# Patient Record
Sex: FEMALE | Race: WHITE | NOT HISPANIC OR LATINO | URBAN - METROPOLITAN AREA
[De-identification: names, ages, dates, MRNs, and addresses within clinical notes are randomized per-mention and may not be internally consistent; named-entity substitution may affect disease eponyms.]

---

## 2021-01-11 ENCOUNTER — OFFICE VISIT (OUTPATIENT)
Dept: ORTHOPEDIC SURGERY | Facility: CLINIC | Age: 56
End: 2021-01-11

## 2021-01-11 VITALS — HEIGHT: 63 IN | BODY MASS INDEX: 28.53 KG/M2 | TEMPERATURE: 96.9 F | WEIGHT: 161 LBS

## 2021-01-11 DIAGNOSIS — R52 PAIN: Primary | ICD-10-CM

## 2021-01-11 PROCEDURE — 99203 OFFICE O/P NEW LOW 30 MIN: CPT | Performed by: NURSE PRACTITIONER

## 2021-01-11 PROCEDURE — 20610 DRAIN/INJ JOINT/BURSA W/O US: CPT | Performed by: NURSE PRACTITIONER

## 2021-01-11 PROCEDURE — 73501 X-RAY EXAM HIP UNI 1 VIEW: CPT | Performed by: NURSE PRACTITIONER

## 2021-01-11 RX ORDER — METHYLPREDNISOLONE ACETATE 80 MG/ML
80 INJECTION, SUSPENSION INTRA-ARTICULAR; INTRALESIONAL; INTRAMUSCULAR; SOFT TISSUE
Status: COMPLETED | OUTPATIENT
Start: 2021-01-11 | End: 2021-01-11

## 2021-01-11 RX ORDER — NAPROXEN SODIUM 220 MG
220 TABLET ORAL 2 TIMES DAILY PRN
COMMUNITY

## 2021-01-11 RX ADMIN — METHYLPREDNISOLONE ACETATE 80 MG: 80 INJECTION, SUSPENSION INTRA-ARTICULAR; INTRALESIONAL; INTRAMUSCULAR; SOFT TISSUE at 10:40

## 2021-01-11 NOTE — PROGRESS NOTES
"Patient: Ethel Lentz  YOB: 1965 55 y.o. female  Medical Record Number: [id number]    Chief Complaints:   Chief Complaint   Patient presents with   • Left Hip - Establish Care, Pain       History of Present Illness:Ethel Lentz is a 55 y.o. female who presents as a new patient both myself as well as the practice with complaints of left lateral hip pain.  Patient denies any injury.  Reports it started several months ago has progressively gotten worse.  Describes the hip pain as a moderate throbbing aching type pain worse at night when she lies on her side also standing working walking running climbing stairs, better with heat and Aleve    Allergies:   Allergies   Allergen Reactions   • Latex Swelling     Per patient Latex gloves made her hands swell   • Sulfur Hives       Medications:   No current outpatient medications on file.     No current facility-administered medications for this visit.          The following portions of the patient's history were reviewed and updated as appropriate: allergies, current medications, past family history, past medical history, past social history, past surgical history and problem list.    Review of Systems:   A 14 point review of systems was performed. All systems negative except pertinent positives/negative listed in HPI above    Physical Exam:   Vitals:    01/11/21 1021   Temp: 96.9 °F (36.1 °C)   TempSrc: Temporal   Weight: 73 kg (161 lb)   Height: 160 cm (63\")       General: A and O x 3, ASA, NAD    SCLERA:    Normal    DENTITION:   Normal  Skin clear no unusual lesions noted  Left hip patient is tender over left hip greater trochanteric bursa she has normal internal X rotation with a negative Stinchfield negative logroll calf is soft and nontender    Radiology:  Xrays 2 views of left hip ordered and reviewed today secondary to pain and were normal.  No compared to views available    Assessment/Plan:  Left hip greater trochanteric bursitis    Patient I " discussed options, she would like to proceed with left hip bursa injection, she will start outpatient physical therapy and I will see her back as needed    Large Joint Arthrocentesis: L greater trochanteric bursa  Date/Time: 1/11/2021 10:40 AM  Consent given by: patient  Site marked: site marked  Timeout: Immediately prior to procedure a time out was called to verify the correct patient, procedure, equipment, support staff and site/side marked as required   Supporting Documentation  Indications: pain   Procedure Details  Location: hip - L greater trochanteric bursa  Preparation: Patient was prepped and draped in the usual sterile fashion  Needle size: 25 G  Approach: lateral  Medications administered: 2 mL lidocaine (cardiac); 80 mg methylPREDNISolone acetate 80 MG/ML        Kisha Daley, APRN

## 2021-02-25 ENCOUNTER — TELEPHONE (OUTPATIENT)
Dept: ORTHOPEDIC SURGERY | Facility: CLINIC | Age: 56
End: 2021-02-25

## 2021-02-25 DIAGNOSIS — M25.552 LEFT HIP PAIN: Primary | ICD-10-CM

## 2021-02-25 NOTE — TELEPHONE ENCOUNTER
Caller: ALEXEI BUSCH   Relationship to Patient: SELF     Phone Number: [phone]  Reason for Call: PATIENT WOULD LIKE TO SPEAK WITH DEVIKA ABOUT HER REFERRAL TO PHYSICAL THERAPY, PATIENT STATED TO THE PHYSICAL THERAPY OFFICE THAT SHE WOULD LIKE TO BE SEEN IN [place]. PATIENT STATES THAT HER HIP STARTED HURTING HER AGAIN AND SHE HASN'T BEEN TO PT YET. PATIENT WOULD LIKE TO SPEAK TO SOMEONE ABOUT WHAT SHE NEEDS TO DO FROM HERE.

## 2021-02-26 NOTE — TELEPHONE ENCOUNTER
Please call patient, okay to place order for physical therapy, outgoing, and fax order to wherever patient would like to start PT.  Thank you

## 2021-02-26 NOTE — TELEPHONE ENCOUNTER
LEFT MESSAGE TO CALL BACK AND GIVE US NAME AND FAX NUMBER OF PHYSICAL THERAPY PLACE AND WE WILL BE GLAD TO FAX ORDER